# Patient Record
Sex: FEMALE | Race: WHITE | HISPANIC OR LATINO | Employment: PART TIME | URBAN - METROPOLITAN AREA
[De-identification: names, ages, dates, MRNs, and addresses within clinical notes are randomized per-mention and may not be internally consistent; named-entity substitution may affect disease eponyms.]

---

## 2024-05-13 ENCOUNTER — OFFICE VISIT (OUTPATIENT)
Dept: OBSTETRICS AND GYNECOLOGY | Facility: CLINIC | Age: 24
End: 2024-05-13
Payer: COMMERCIAL

## 2024-05-13 VITALS
SYSTOLIC BLOOD PRESSURE: 108 MMHG | DIASTOLIC BLOOD PRESSURE: 74 MMHG | HEIGHT: 64 IN | BODY MASS INDEX: 29.23 KG/M2 | WEIGHT: 171.2 LBS

## 2024-05-13 DIAGNOSIS — Z01.419 PAP TEST, AS PART OF ROUTINE GYNECOLOGICAL EXAMINATION: Primary | ICD-10-CM

## 2024-05-13 NOTE — PROGRESS NOTES
Gynecologic Annual Exam Note        Gynecologic Exam (New patient) and Irregular Cycles        Subjective     HPI  Goldie Fernández is a 24 y.o. No obstetric history on file. female who presents for annual well woman exam as a new patient. There were no changes to her medical or surgical history since her last visit.. Patient's last menstrual period was 05/08/2024 (approximate).  Irregular cycles  The flow is heavy. She reports dysmenorrhea is severe occurring first 1-2 days of flow.     Marital Status: single.  She is sexually active. She has not had new partners.. STD testing recommendations have been explained to the patient and she does desire STD testing.    The patient would like to discuss the following complaints today: irregular cycles and wants paragard string check.    Additional OB/GYN History   contraceptive methods: IUD.  Insertion date: 2020  Desires to: continue contraception  Thromboembolic Disease: none  History of migraines:  none  Age of menarche: 13    History of STD: no    Last Pap : never.  Last Completed Pap Smear            PAP SMEAR (Every 3 Years) Next due on 5/13/2027 05/13/2024  LIQUID-BASED PAP SMEAR WITH HPV GENOTYPING IF ASCUS (M[facility]ELO,COR,MAD)                     History of abnormal Pap smear:  N/A  Gardasil status:completed  Family history of uterine, colon, breast, or ovarian cancer:  unknown  Performs monthly Self-Breast Exam: no  Exercises Regularly:yes  Feelings of Anxiety or Depression: yes - not on medication  Tobacco Usage?: No     No current outpatient medications on file.         OB History    No obstetric history on file.         Health Maintenance   Topic Date Due    Annual Gynecologic Pelvic and Breast Exam  Never done    BMI FOLLOWUP  Never done    HPV VACCINES (1 - 3-dose series) Never done    ANNUAL PHYSICAL  Never done    TDAP/TD VACCINES (2 - Td or Tdap) 03/04/2023    COVID-19 Vaccine (3 - 2023-24 season) 09/01/2023    INFLUENZA VACCINE  08/01/2024    CHLAMYDIA  "SCREENING  05/13/2025    PAP SMEAR  05/13/2027    HEPATITIS C SCREENING  Completed    Pneumococcal Vaccine 0-64  Aged Out    MENINGOCOCCAL VACCINE  Aged Out       Past Medical History:   Diagnosis Date    Anxiety     Chronic UTI     Congenital hernia     OCD (obsessive compulsive disorder)         Past Surgical History:   Procedure Laterality Date    HERNIA REPAIR      age 4    HERNIA REPAIR      WISDOM TOOTH EXTRACTION         The additional following portions of the patient's history were reviewed and updated as appropriate: allergies, current medications, past family history, past medical history, past social history, past surgical history, and problem list.    Review of Systems   Constitutional: Negative.    HENT: Negative.     Eyes: Negative.    Respiratory: Negative.     Cardiovascular: Negative.    Gastrointestinal: Negative.    Endocrine: Negative.    Genitourinary: Negative.    Musculoskeletal: Negative.    Skin: Negative.    Allergic/Immunologic: Negative.    Neurological: Negative.    Hematological: Negative.    Psychiatric/Behavioral: Negative.           I have reviewed and agree with the HPI, ROS, and historical information as entered above. Mirta Martinez MD          Objective   /74   Ht 162.6 cm (64\")   Wt 77.7 kg (171 lb 3.2 oz)   LMP 05/08/2024 (Approximate)   BMI 29.39 kg/m²     Physical Exam  General:  well developed; well nourished  no acute distress  Skin:  No suspicious lesions seen  Thyroid: normal to inspection and palpation  Breasts:  Examined in supine position  Symmetric without masses or skin dimpling  Nipples normal without inversion, lesions or discharge  There are no palpable axillary nodes  CVS: RRR, no M/R/G, distal pulses wnl  Resp: CTAB, No W/R/R  Abdomen: soft, non-tender; no masses  no umbilical or inguinal hernias are present  no hepato-splenomegaly  Pelvis: Clinical staff was present for exam  External genitalia:  normal appearance of the external genitalia " including [place]th[place]'s and [place]'s glands.  Urethra: no masses or tenderness  Urethral meatus: normal size;  No lesions or signs of prolapse  Bladder: non tender to palpation, no masses, no prolapse  Vagina:  normal pink mucosa without prolapse or lesions.  Cervix:  normal appearance. Strings visualized  Uterus:  normal size, shape and consistency.  Adnexa:  normal bimanual exam of the adnexa.  Perineum/Anus: normal appearance, no external hemorrhoids  Ext: 2+ pulses, no edema       Assessment and Plan    Problem List Items Addressed This Visit       Pap test, as part of routine gynecological examination - Primary    Relevant Orders    LIQUID-BASED PAP SMEAR WITH HPV GENOTYPING IF ASCUS ([facility]ELO,COR,MAD) (Completed)    HIV-1 / O / 2 Ag / Antibody (Completed)    RPR (Completed)    Hepatitis B Surface Antigen (Completed)    Hepatitis C Antibody (Completed)       GYN annual well woman exam.   Reviewed pap guidelines.   Encouraged use of condoms for STD prevention.  Reviewed monthly self breast exams.  Instructed to call with lumps, pain, or breast discharge.    Reviewed exercise as a preventative health measures.   Encouraged HPV vaccination  Counseling regarding bleeding with paragard.  Option to change to [place] but patient declines  Return in about 1 year (around 5/13/2025) for Appropriate for followup with NP as desired..    Mirta Martinez MD  05/13/2024

## 2024-05-14 LAB
HBV SURFACE AG SERPL QL IA: NEGATIVE
HCV IGG SERPL QL IA: NON REACTIVE
HIV 1+2 AB+HIV1 P24 AG SERPL QL IA: NON REACTIVE
RPR SER QL: NON REACTIVE

## 2024-05-15 LAB — REF LAB TEST METHOD: NORMAL

## 2024-07-17 ENCOUNTER — TELEPHONE (OUTPATIENT)
Dept: OBSTETRICS AND GYNECOLOGY | Facility: CLINIC | Age: 24
End: 2024-07-17
Payer: COMMERCIAL

## 2024-07-17 NOTE — TELEPHONE ENCOUNTER
Patient saw Dr. Martinez for New GYN visit 05/13/24.  Returned patient's call.   States her partner was diagnosed with syphilis yesterday. She is concerned for herself.   She has not been notified of her lab results from visit here in May.   Reviewed lab results with her and informed her they are all negative.   However, with her partner's recent diagnosis of syphilis, she could have been infected since that testing. She v/u and would like to be seen to be tested again. Appointment scheduled.

## 2024-07-23 ENCOUNTER — OFFICE VISIT (OUTPATIENT)
Dept: OBSTETRICS AND GYNECOLOGY | Facility: CLINIC | Age: 24
End: 2024-07-23
Payer: COMMERCIAL

## 2024-07-23 VITALS
DIASTOLIC BLOOD PRESSURE: 74 MMHG | BODY MASS INDEX: 30.05 KG/M2 | SYSTOLIC BLOOD PRESSURE: 124 MMHG | HEIGHT: 64 IN | WEIGHT: 176 LBS

## 2024-07-23 DIAGNOSIS — Z20.2 EXPOSURE TO STD: Primary | ICD-10-CM

## 2024-07-23 PROCEDURE — 99213 OFFICE O/P EST LOW 20 MIN: CPT
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
## 2024-07-24 LAB
C TRACH RRNA SPEC QL NAA+PROBE: NEGATIVE
HAV IGM SERPL QL IA: NEGATIVE
HBV CORE IGM SERPL QL IA: NEGATIVE
HBV SURFACE AG SERPL QL IA: NEGATIVE
HCV AB SERPL QL IA: NORMAL
HCV IGG SERPL QL IA: NON REACTIVE
HIV 1+2 AB+HIV1 P24 AG SERPL QL IA: NON REACTIVE
HSV1 IGG SER IA-ACNC: 3.94 INDEX (ref 0–0.9)
HSV2 IGG SER IA-ACNC: 1.99 INDEX (ref 0–0.9)
N GONORRHOEA RRNA SPEC QL NAA+PROBE: NEGATIVE
RPR SER QL: NON REACTIVE
T VAGINALIS RRNA SPEC QL NAA+PROBE: NEGATIVE

## 2024-07-25 LAB
HCV RNA SERPL NAA+PROBE-ACNC: NORMAL IU/ML
TEST INFORMATION: NORMAL

## 2024-07-26 LAB
C TRACH RRNA SPEC QL NAA+PROBE: NEGATIVE
N GONORRHOEA RRNA SPEC QL NAA+PROBE: NEGATIVE
T VAGINALIS RRNA SPEC QL NAA+PROBE: NEGATIVE